# Patient Record
Sex: MALE | Race: WHITE | NOT HISPANIC OR LATINO | URBAN - METROPOLITAN AREA
[De-identification: names, ages, dates, MRNs, and addresses within clinical notes are randomized per-mention and may not be internally consistent; named-entity substitution may affect disease eponyms.]

---

## 2023-08-04 NOTE — PROGRESS NOTES
"Subjective   Patient ID: Romain Cabrera is a 22 y.o. male who presents for Annual Exam.  Today he is accompanied by alone.     HPI    Health maintenance   Overall patient is doing well.   Immigrated from the [place] when he was 14, officially becoming an [place] citizen in 2022.   Graduated from [employer] this spring, majoring in finance  Immunization: Believes he is UTD with immunizations.   Unsure about Tdap and willing to renew this today.   UTD on COVID-19 vaccine (Pfizer) x2  Tdap unknown    Colon Cancer Screening: No family history of colon cancer or any other types of cancer.   Diet: Balanced  Exercise: Exercises 5 times a week   Tobacco: Denies use  EtOH: Socially. Averaging 10 beers a week in college. Less now since he graduated from college.     2. Acid reflux  Reports ongoing substernal burning throughout the day.  GI evaluation in [place] attributing this to acid reflux, ordering omeprazole x 30 days with follow-up EGD for persistent symptoms.   Pt denies strict adherence to BRAT diet, \"masked it with the omeprazole and not eating great.\"   Requesting renewed Omeprazole prescription today before additional GI referral for EGD.     No current outpatient medications on file prior to visit.     No current facility-administered medications on file prior to visit.        No Known Allergies    Immunization History   Administered Date(s) Administered    Pfizer Gray Cap SARS-CoV-2 01/25/2022    Pfizer Purple Cap SARS-CoV-2 01/25/2021, 02/15/2021    Tdap vaccine, age 10 years and older (BOOSTRIX) 08/07/2023         Review of Systems  All pertinent positive symptoms are included in the history of present illness.  All other systems have been reviewed and are negative and noncontributory to this patient's current ailments.     Objective   /80 (BP Location: Left arm, Patient Position: Sitting, BP Cuff Size: Adult)   Pulse 68   Ht 1.702 m (5' 7\")   Wt 74.5 kg (164 lb 3.2 oz)   BMI 25.72 " kg/m²   BSA: 1.88 meters squared  No visits with results within 1 Month(s) from this visit.   Latest known visit with results is:   Legacy Encounter on 08/15/2022   Component Date Value Ref Range Status    Glucose 08/15/2022 84  74 - 99 mg/dL Final    Sodium 08/15/2022 139  136 - 145 mmol/L Final    Potassium 08/15/2022 3.8  3.5 - 5.3 mmol/L Final    Chloride 08/15/2022 102  98 - 107 mmol/L Final    Bicarbonate 08/15/2022 28  21 - 32 mmol/L Final    Anion Gap 08/15/2022 13  10 - 20 mmol/L Final    Urea Nitrogen 08/15/2022 16  6 - 23 mg/dL Final    Creatinine 08/15/2022 1.05  0.50 - 1.30 mg/dL Final    GFR MALE 08/15/2022 >90  >90 mL/min/1.73m2 Final    Comment:  CALCULATIONS OF ESTIMATED GFR ARE PERFORMED   USING THE 2021 CKD-EPI STUDY REFIT EQUATION   WITHOUT THE RACE VARIABLE FOR THE IDMS-TRACEABLE   CREATININE METHODS.    https://jasn.asnjournals.org/content/early/2021/09/22/ASN.[email or web address]      Calcium 08/15/2022 9.4  8.6 - 10.3 mg/dL Final    Albumin 08/15/2022 4.6  3.4 - 5.0 g/dL Final    Alkaline Phosphatase 08/15/2022 75  33 - 120 U/L Final    Total Protein 08/15/2022 7.1  6.4 - 8.2 g/dL Final    AST 08/15/2022 22  9 - 39 U/L Final    Total Bilirubin 08/15/2022 1.3 (H)  0.0 - 1.2 mg/dL Final    ALT (SGPT) 08/15/2022 9 (L)  10 - 52 U/L Final    Comment:  Patients treated with Sulfasalazine may generate    falsely decreased results for ALT.         Physical Exam  CONSTITUTIONAL - well nourished, well developed, looks like stated age, in no acute distress, not ill-appearing, and not tired appearing  SKIN - normal skin color and pigmentation, normal skin turgor without rash, or nodules visualized  3-4 circular, 1 inch, hypopigmented areas of peeling on thenar eminences bilaterally. No associated erythema, inflammation, warmth, or bleeding.   HEAD - no trauma, normocephalic  EYES - pupils are equal and reactive to light, extraocular muscles are intact, and normal external exam  ENT - TM's intact, no injection, no  signs of infection, uvula midline, normal tongue movement and throat normal, no exudate, nasal passage without discharge and patent  NECK - no neck mass was observed,   CHEST - clear to auscultation, no wheezing, no crackles and no rales, good effort  CARDIAC - regular rate and regular rhythm, no skipped beats, no murmur  ABDOMEN - no organomegaly, soft, nontender, nondistended, normal bowel sounds, no guarding/rebound/rigidity, negative McBurney sign and negative Vizcarra sign  EXTREMITIES - no edema, no deformities  NEUROLOGICAL - normal gait, normal balance, normal motor, no ataxia, patellar DTRs equal and symmetrical; alert, oriented and no focal signs  PSYCHIATRIC - alert, pleasant and cordial, age-appropriate      Assessment/Plan   Health maintenance   Complete history and physical examination was performed.   Immunization history unknown, completed prior to pt becoming an American citizen (14 y.o)   Tdap renewed in clinic today.   Requisition for CBC, CMP, TSH, lipid, A1c has been provided to you  We will notify of test results once available and make treatment recommendations accordingly    2.  Acid reflux  Discussed etiology of symptoms as being related to acid reflux.   Start taking omeprazole 20 mg once daily for th next 30 days with strict adherence to BRAT diet and FODMAP.   If unimproved after trial with omeprazole, plan for GI referral for EGD.

## 2023-08-07 ENCOUNTER — OFFICE VISIT (OUTPATIENT)
Dept: PRIMARY CARE | Facility: CLINIC | Age: 23
End: 2023-08-07
Payer: COMMERCIAL

## 2023-08-07 ENCOUNTER — LAB (OUTPATIENT)
Dept: LAB | Facility: LAB | Age: 23
End: 2023-08-07
Payer: COMMERCIAL

## 2023-08-07 VITALS
WEIGHT: 164.2 LBS | HEART RATE: 68 BPM | DIASTOLIC BLOOD PRESSURE: 80 MMHG | SYSTOLIC BLOOD PRESSURE: 114 MMHG | BODY MASS INDEX: 25.77 KG/M2 | HEIGHT: 67 IN

## 2023-08-07 DIAGNOSIS — Z00.00 HEALTHCARE MAINTENANCE: ICD-10-CM

## 2023-08-07 DIAGNOSIS — Z00.00 HEALTHCARE MAINTENANCE: Primary | ICD-10-CM

## 2023-08-07 DIAGNOSIS — K21.9 GASTROESOPHAGEAL REFLUX DISEASE WITHOUT ESOPHAGITIS: ICD-10-CM

## 2023-08-07 DIAGNOSIS — Z23 NEED FOR TDAP VACCINATION: ICD-10-CM

## 2023-08-07 PROBLEM — J30.9 ALLERGIC RHINITIS: Status: ACTIVE | Noted: 2023-08-07

## 2023-08-07 PROBLEM — R17 ELEVATED BILIRUBIN: Status: ACTIVE | Noted: 2023-08-07

## 2023-08-07 LAB
ALANINE AMINOTRANSFERASE (SGPT) (U/L) IN SER/PLAS: 6 U/L (ref 10–52)
ALBUMIN (G/DL) IN SER/PLAS: 4.4 G/DL (ref 3.4–5)
ALKALINE PHOSPHATASE (U/L) IN SER/PLAS: 76 U/L (ref 33–120)
ANION GAP IN SER/PLAS: 11 MMOL/L (ref 10–20)
ASPARTATE AMINOTRANSFERASE (SGOT) (U/L) IN SER/PLAS: 18 U/L (ref 9–39)
BASOPHILS (10*3/UL) IN BLOOD BY AUTOMATED COUNT: 0.07 X10E9/L (ref 0–0.1)
BASOPHILS/100 LEUKOCYTES IN BLOOD BY AUTOMATED COUNT: 1.3 % (ref 0–2)
BILIRUBIN TOTAL (MG/DL) IN SER/PLAS: 2 MG/DL (ref 0–1.2)
CALCIUM (MG/DL) IN SER/PLAS: 9.4 MG/DL (ref 8.6–10.3)
CARBON DIOXIDE, TOTAL (MMOL/L) IN SER/PLAS: 28 MMOL/L (ref 21–32)
CHLORIDE (MMOL/L) IN SER/PLAS: 103 MMOL/L (ref 98–107)
CHOLESTEROL (MG/DL) IN SER/PLAS: 151 MG/DL (ref 0–199)
CHOLESTEROL IN HDL (MG/DL) IN SER/PLAS: 87.5 MG/DL
CHOLESTEROL/HDL RATIO: 1.7
CREATININE (MG/DL) IN SER/PLAS: 1.04 MG/DL (ref 0.5–1.3)
EOSINOPHILS (10*3/UL) IN BLOOD BY AUTOMATED COUNT: 0.41 X10E9/L (ref 0–0.7)
EOSINOPHILS/100 LEUKOCYTES IN BLOOD BY AUTOMATED COUNT: 7.3 % (ref 0–6)
ERYTHROCYTE DISTRIBUTION WIDTH (RATIO) BY AUTOMATED COUNT: 12.5 % (ref 11.5–14.5)
ERYTHROCYTE MEAN CORPUSCULAR HEMOGLOBIN CONCENTRATION (G/DL) BY AUTOMATED: 33.4 G/DL (ref 32–36)
ERYTHROCYTE MEAN CORPUSCULAR VOLUME (FL) BY AUTOMATED COUNT: 90 FL (ref 80–100)
ERYTHROCYTES (10*6/UL) IN BLOOD BY AUTOMATED COUNT: 4.86 X10E12/L (ref 4.5–5.9)
ESTIMATED AVERAGE GLUCOSE FOR HBA1C: 100 MG/DL
GFR MALE: >90 ML/MIN/1.73M2
GLUCOSE (MG/DL) IN SER/PLAS: 90 MG/DL (ref 74–99)
HEMATOCRIT (%) IN BLOOD BY AUTOMATED COUNT: 43.7 % (ref 41–52)
HEMOGLOBIN (G/DL) IN BLOOD: 14.6 G/DL (ref 13.5–17.5)
HEMOGLOBIN A1C/HEMOGLOBIN TOTAL IN BLOOD: 5.1 %
HEPATITIS C VIRUS AB PRESENCE IN SERUM: NONREACTIVE
HIV 1/ 2 AG/AB SCREEN: NONREACTIVE
IMMATURE GRANULOCYTES/100 LEUKOCYTES IN BLOOD BY AUTOMATED COUNT: 0.5 % (ref 0–0.9)
LDL: 35 MG/DL (ref 0–119)
LEUKOCYTES (10*3/UL) IN BLOOD BY AUTOMATED COUNT: 5.6 X10E9/L (ref 4.4–11.3)
LYMPHOCYTES (10*3/UL) IN BLOOD BY AUTOMATED COUNT: 1.56 X10E9/L (ref 1.2–4.8)
LYMPHOCYTES/100 LEUKOCYTES IN BLOOD BY AUTOMATED COUNT: 27.9 % (ref 13–44)
MONOCYTES (10*3/UL) IN BLOOD BY AUTOMATED COUNT: 0.49 X10E9/L (ref 0.1–1)
MONOCYTES/100 LEUKOCYTES IN BLOOD BY AUTOMATED COUNT: 8.8 % (ref 2–10)
NEUTROPHILS (10*3/UL) IN BLOOD BY AUTOMATED COUNT: 3.04 X10E9/L (ref 1.2–7.7)
NEUTROPHILS/100 LEUKOCYTES IN BLOOD BY AUTOMATED COUNT: 54.2 % (ref 40–80)
NON HDL CHOLESTEROL: 64 MG/DL (ref 0–149)
PLATELETS (10*3/UL) IN BLOOD AUTOMATED COUNT: 336 X10E9/L (ref 150–450)
POTASSIUM (MMOL/L) IN SER/PLAS: 4.1 MMOL/L (ref 3.5–5.3)
PROTEIN TOTAL: 7 G/DL (ref 6.4–8.2)
SODIUM (MMOL/L) IN SER/PLAS: 138 MMOL/L (ref 136–145)
THYROTROPIN (MIU/L) IN SER/PLAS BY DETECTION LIMIT <= 0.05 MIU/L: 2.33 MIU/L (ref 0.44–3.98)
TRIGLYCERIDE (MG/DL) IN SER/PLAS: 145 MG/DL (ref 0–149)
UREA NITROGEN (MG/DL) IN SER/PLAS: 15 MG/DL (ref 6–23)
VLDL: 29 MG/DL (ref 0–40)

## 2023-08-07 PROCEDURE — 80061 LIPID PANEL: CPT

## 2023-08-07 PROCEDURE — 90471 IMMUNIZATION ADMIN: CPT | Performed by: STUDENT IN AN ORGANIZED HEALTH CARE EDUCATION/TRAINING PROGRAM

## 2023-08-07 PROCEDURE — 90715 TDAP VACCINE 7 YRS/> IM: CPT | Performed by: STUDENT IN AN ORGANIZED HEALTH CARE EDUCATION/TRAINING PROGRAM

## 2023-08-07 PROCEDURE — 99395 PREV VISIT EST AGE 18-39: CPT | Performed by: STUDENT IN AN ORGANIZED HEALTH CARE EDUCATION/TRAINING PROGRAM

## 2023-08-07 PROCEDURE — 83036 HEMOGLOBIN GLYCOSYLATED A1C: CPT

## 2023-08-07 PROCEDURE — 36415 COLL VENOUS BLD VENIPUNCTURE: CPT

## 2023-08-07 PROCEDURE — 85025 COMPLETE CBC W/AUTO DIFF WBC: CPT

## 2023-08-07 PROCEDURE — 80053 COMPREHEN METABOLIC PANEL: CPT

## 2023-08-07 PROCEDURE — 87389 HIV-1 AG W/HIV-1&-2 AB AG IA: CPT

## 2023-08-07 PROCEDURE — 99213 OFFICE O/P EST LOW 20 MIN: CPT | Performed by: STUDENT IN AN ORGANIZED HEALTH CARE EDUCATION/TRAINING PROGRAM

## 2023-08-07 PROCEDURE — 1036F TOBACCO NON-USER: CPT | Performed by: STUDENT IN AN ORGANIZED HEALTH CARE EDUCATION/TRAINING PROGRAM

## 2023-08-07 PROCEDURE — 84443 ASSAY THYROID STIM HORMONE: CPT

## 2023-08-07 PROCEDURE — 86803 HEPATITIS C AB TEST: CPT

## 2023-08-07 RX ORDER — OMEPRAZOLE 20 MG/1
20 CAPSULE, DELAYED RELEASE ORAL
Qty: 90 CAPSULE | Refills: 0 | Status: SHIPPED | OUTPATIENT
Start: 2023-08-07

## 2023-08-08 ENCOUNTER — TELEPHONE (OUTPATIENT)
Dept: PRIMARY CARE | Facility: CLINIC | Age: 23
End: 2023-08-08
Payer: COMMERCIAL

## 2023-08-08 NOTE — TELEPHONE ENCOUNTER
----- Message from Vijay Peña DO sent at 8/8/2023  6:31 AM EDT -----  Sugar, kidneys, liver, electrolytes are all within normal limits and/were stable    Bilirubin still slightly elevated at 2.0 and ALT slightly low at 6 but overall this has been stable and the AST is within normal limits    The patient feels well there is nothing else that needs to be done other than just monitoring every 1-2 years    If the patient would like, we could get him to a GI specialist to make sure that this bilirubin is nothing of major concern    HIV and hepatitis C negative    Hemoglobin A1c well within normal limits    Cholesterol looks great at 151, HDL 87, LDL 35, triglycerides 145    Thyroid within normal limits    Complete blood cell count shows no anemia

## 2023-08-08 NOTE — RESULT ENCOUNTER NOTE
Sugar, kidneys, liver, electrolytes are all within normal limits and/were stable    Bilirubin still slightly elevated at 2.0 and ALT slightly low at 6 but overall this has been stable and the AST is within normal limits    The patient feels well there is nothing else that needs to be done other than just monitoring every 1-2 years    If the patient would like, we could get him to a GI specialist to make sure that this bilirubin is nothing of major concern    HIV and hepatitis C negative    Hemoglobin A1c well within normal limits    Cholesterol looks great at 151, HDL 87, LDL 35, triglycerides 145    Thyroid within normal limits    Complete blood cell count shows no anemia

## 2023-08-09 DIAGNOSIS — R79.89 ABNORMAL BILIRUBIN TEST: ICD-10-CM
